# Patient Record
Sex: MALE
[De-identification: names, ages, dates, MRNs, and addresses within clinical notes are randomized per-mention and may not be internally consistent; named-entity substitution may affect disease eponyms.]

---

## 2018-05-29 ENCOUNTER — HOSPITAL ENCOUNTER (EMERGENCY)
Dept: HOSPITAL 14 - H.ER | Age: 23
Discharge: HOME | End: 2018-05-29
Payer: COMMERCIAL

## 2018-05-29 VITALS — OXYGEN SATURATION: 98 % | RESPIRATION RATE: 16 BRPM

## 2018-05-29 VITALS — SYSTOLIC BLOOD PRESSURE: 132 MMHG | DIASTOLIC BLOOD PRESSURE: 71 MMHG | TEMPERATURE: 98.1 F

## 2018-05-29 DIAGNOSIS — R07.89: Primary | ICD-10-CM

## 2018-05-29 LAB
ALBUMIN SERPL-MCNC: 4.4 G/DL (ref 3.5–5)
ALBUMIN/GLOB SERPL: 1.2 {RATIO} (ref 1–2.1)
ALT SERPL-CCNC: 51 U/L (ref 21–72)
APTT BLD: 29.8 SECONDS (ref 25.6–37.1)
AST SERPL-CCNC: 35 U/L (ref 17–59)
BASOPHILS # BLD AUTO: 0.1 K/UL (ref 0–0.2)
BASOPHILS NFR BLD: 0.9 % (ref 0–2)
BUN SERPL-MCNC: 13 MG/DL (ref 9–20)
CALCIUM SERPL-MCNC: 9.2 MG/DL (ref 8.4–10.2)
D DIMER: 96 NG/MLDDU (ref 0–230)
EOSINOPHIL # BLD AUTO: 0.1 K/UL (ref 0–0.7)
EOSINOPHIL NFR BLD: 1.7 % (ref 0–4)
ERYTHROCYTE [DISTWIDTH] IN BLOOD BY AUTOMATED COUNT: 13.8 % (ref 11.5–14.5)
GFR NON-AFRICAN AMERICAN: > 60
HGB BLD-MCNC: 14.3 G/DL (ref 12–18)
INR PPP: 0.9 (ref 0.9–1.2)
LIPASE SERPL-CCNC: 29 U/L (ref 23–300)
LYMPHOCYTES # BLD AUTO: 2.4 K/UL (ref 1–4.3)
LYMPHOCYTES NFR BLD AUTO: 31.8 % (ref 20–40)
MCH RBC QN AUTO: 31.9 PG (ref 27–31)
MCHC RBC AUTO-ENTMCNC: 35.8 G/DL (ref 33–37)
MCV RBC AUTO: 89 FL (ref 80–94)
MONOCYTES # BLD: 0.4 K/UL (ref 0–0.8)
MONOCYTES NFR BLD: 5.1 % (ref 0–10)
NEUTROPHILS # BLD: 4.5 K/UL (ref 1.8–7)
NEUTROPHILS NFR BLD AUTO: 60.5 % (ref 50–75)
NRBC BLD AUTO-RTO: 0 % (ref 0–0)
PLATELET # BLD: 241 K/UL (ref 130–400)
PMV BLD AUTO: 8.3 FL (ref 7.2–11.7)
PROTHROMBIN TIME: 10 SECONDS (ref 9.8–13.1)
RBC # BLD AUTO: 4.48 MIL/UL (ref 4.4–5.9)
WBC # BLD AUTO: 7.5 K/UL (ref 4.8–10.8)

## 2018-05-29 PROCEDURE — 87536 HIV-1 QUANT&REVRSE TRNSCRPJ: CPT

## 2018-05-29 PROCEDURE — 85025 COMPLETE CBC W/AUTO DIFF WBC: CPT

## 2018-05-29 PROCEDURE — 80349 CANNABINOIDS NATURAL: CPT

## 2018-05-29 PROCEDURE — 85730 THROMBOPLASTIN TIME PARTIAL: CPT

## 2018-05-29 PROCEDURE — 80053 COMPREHEN METABOLIC PANEL: CPT

## 2018-05-29 PROCEDURE — 96372 THER/PROPH/DIAG INJ SC/IM: CPT

## 2018-05-29 PROCEDURE — 99283 EMERGENCY DEPT VISIT LOW MDM: CPT

## 2018-05-29 PROCEDURE — 83690 ASSAY OF LIPASE: CPT

## 2018-05-29 PROCEDURE — 80353 DRUG SCREENING COCAINE: CPT

## 2018-05-29 PROCEDURE — 85610 PROTHROMBIN TIME: CPT

## 2018-05-29 PROCEDURE — 80361 OPIATES 1 OR MORE: CPT

## 2018-05-29 PROCEDURE — 71046 X-RAY EXAM CHEST 2 VIEWS: CPT

## 2018-05-29 PROCEDURE — 80324 DRUG SCREEN AMPHETAMINES 1/2: CPT

## 2018-05-29 PROCEDURE — 80346 BENZODIAZEPINES1-12: CPT

## 2018-05-29 PROCEDURE — 80345 DRUG SCREENING BARBITURATES: CPT

## 2018-05-29 PROCEDURE — 85378 FIBRIN DEGRADE SEMIQUANT: CPT

## 2018-05-29 PROCEDURE — 80358 DRUG SCREENING METHADONE: CPT

## 2018-05-29 PROCEDURE — 83992 ASSAY FOR PHENCYCLIDINE: CPT

## 2018-05-29 PROCEDURE — 84484 ASSAY OF TROPONIN QUANT: CPT

## 2018-05-29 NOTE — ED PDOC
HPI: Chest Pain


Time Seen by Provider: 05/29/18 11:31


Chief Complaint (Nursing): Chest Pain


Chief Complaint (Provider): Chest Pain


History Per: Patient


History/Exam Limitations: no limitations


Onset/Duration Of Symptoms: Days (x3)


Current Symptoms Are (Timing): Still Present


Quality: Squeezing


Additional Complaint(s): 


23 year old male, with a past medical history of HIV, presents to ED 

complaining of intermittent chest pain on the left side for the past 3 days. He 

states pain comes at rest and doesn't worsen with activity or exertion. Patient 

also reports right sided ear ache associated with a headache for the past 2 

weeks.  He was seen by his PMD last week and prescribed antibiotic ear drops 

which have relieved symptoms but wants repeat inspection.  Denies fever, SOB, 

abdominal pain, cough, recent travel, prolonged immobility, calf pain, leg pain

, vision changes, nausea, vomiting, diarrhea, and orthopnea.  Of note, patient 

had a viral load done 4- 6 months ago which was undetectable.  Patient has been 

compliant with his HIV regimen.  





PCP: Jorge Clinton





Past Medical History


Reviewed: Historical Data, Nursing Documentation, Vital Signs


Vital Signs: 


 Last Vital Signs











Temp  98.1 F   05/29/18 14:39


 


Pulse  66   06/01/18 22:20


 


Resp  16   05/29/18 14:39


 


BP  132/71   05/29/18 14:39


 


Pulse Ox  98   06/01/18 22:20














- Medical History


PMH: HIV





- Surgical History


Surgical History: No Surg Hx





- Family History


Family History: States: Unknown Family Hx


Other Family History: Patient is adopted and unaware of family history.





- Social History


Current smoker - smoking cessation education provided: Yes (4 cigarettes daily)


Alcohol: Social


Drugs: Denies





- Home Medications


Home Medications: 


 Ambulatory Orders











 Medication  Instructions  Recorded


 


Naproxen 500 mg PO BID #20 tab 05/29/18














- Allergies


Allergies/Adverse Reactions: 


 Allergies











Allergy/AdvReac Type Severity Reaction Status Date / Time


 


No Known Allergies Allergy   Verified 05/29/18 11:14














Review of Systems


ROS Statement: Except As Marked, All Systems Reviewed And Found Negative


Constitutional: Negative for: Fever


Eyes: Negative for: Vision Change


ENT: Positive for: Ear Pain (associated with headache)


Cardiovascular: Positive for: Chest Pain


Respiratory: Negative for: Cough, Shortness of Breath


Gastrointestinal: Negative for: Nausea, Vomiting, Diarrhea


Musculoskeletal: Negative for: Leg Pain, Other (calf pain)





Physical Exam





- Reviewed


Nursing Documentation Reviewed: Yes


Vital Signs Reviewed: Yes





- Physical Exam


Comments: 


GENERAL APPEARANCE: Patient is awake, alert, oriented x 3, in no acute distress

, resting comfortably, speaking in full sentences. 


SKIN:  Warm, dry; (-) cyanosis.


EYES:  (-) conjunctival pallor.


ENMT:  Mucous membranes moist. EARS: TMs are nonbulging and nonerythematous 

bilaterally. Pharynx: clear (-) erythema (-) exudate. Uvula midline. Nares 

patent (-) rhinorrhea


NECK: Supple, FROM (-) tenderness, (-) stiffness, (-) lymphadenopathy, (-) JVD.


CHEST AND RESPIRATORY:  (-) rash, (-) chest wall tenderness.  Lungs:  (-) rales

, (-) rhonchi, (-) wheezes, (-) rub; breath sounds equal bilaterally. 

Respirations even and nonlabored. 


HEART AND CARDIOVASCULAR:  (-) chest wall tenderness (-) irregularity; (-) 

murmur, (-) gallop, (-) rub.


ABDOMEN AND GI:  Soft; (-) distention, (-) tenderness, (-) palpable pulsatile 

mass, (-) CVA tenderness.


EXTREMITIES:  (-) deformity; (-) edema, (-) calf tenderness.   (+) distal 

pulses.


NEURO AND PSYCH:  Mental status as above.  Cranial nerves grossly intact; 

strength symmetric. Cerebellar tests intact. Gait steady, speech clear.





- Laboratory Results


Result Diagrams: 


 05/29/18 12:15





 05/29/18 12:15


Urine dip results: Positive for: Protein (30).  Negative for: Leukocyte Esterase

, Blood, Nitrate, Ketones, Glucose, Bilirubin





- ECG


ECG: Positive for: Interpreted By Me, Viewed By Me


ECG Rhythm: Positive for: Sinus Rhythm (normal), ST/T Changes (no elevations)


Interpretation Of ECG: 





No ectopy and QTC at 427.  


Rate: 66


O2 Sat by Pulse Oximetry: 98 (RA)


Pulse Ox Interpretation: Normal





Medical Decision Making


Medical Decision Making: 


Initial Impression: Chest pain


Initial Plan: 


IV access


CMP


CBC


D-Dimer


PTT


PT/INR


Drug screen


Lipase


Troponin


Urine dipstick


Chest X-ray


HIV 1 RNA


Toradol 30mg IM


EKG





1345


Labs reviewed and grossly unremarkable. Utox (+) cannabinoids.





CXR reviewed, radiology report follows:


HISTORY:


COMPARISON:


No prior.


TECHNIQUE:


Chest PA and lateral


FINDINGS:


LINES AND TUBES:


None. 


LUNG AND PLEURA:


The lungs are well inflated and clear. No pleural effusion or pneumothorax. 


HEART AND MEDIASTINUM:


The heart is not enlarged. The hilar and mediastinal contours are within normal 

limits.


SKELETAL STRUCTURES:


The bony structures are within normal limits for the patient's age.


VISUALIZED UPPER ABDOMEN:


Normal.


OTHER FINDINGS:


None.


IMPRESSION:


No active pulmonary disease.








1425


On re-evaluation, patient reports resolution of symptoms and denies chest pain, 

SOB, dizziness, headache at present. On exam, patient remains AAOx3, in no 

acute distress. Lungs clear to auscultation, cardiac RRR, abdomen soft, non-

tender, repeat neuro exam shows no focal findings.  VSS, stable for discharge. 


Lab/Diagnostic results d/w the patient in great detail. Diagnosis of 

nonspecific chest pain d/w the patient. 


Based on history, exam and diagnostic results, plan will be for outpatient 

follow up. 





Patient instructed to follow-up with pmd / referral provided / the clinic  in 1-

2 days without fail. Advised to take medication as prescribed. 


Return to the emergency room at any time for any new or worsening symptoms. 

Patient states he fully agrees with and understands discharge instructions. 

States that he agrees with the plan and disposition. Verbalized and repeated 

discharge instructions and plan. 


I have given the patient opportunity to ask any additional questions.





--------------------------------------------------------------------------------

--------------------------------------------------------------------------------

-------


Scribe Attestation:


Documented by Nate Mendenhall acting as a scribe for Lela SOLIMAN.





Provider Scribe Attestation:


All medical record entries made by the Scribe were at my direction and 

personally dictated by me. I have reviewed the chart and agree that the record 

accurately reflects my personal performance of the history, physical exam, 

medical decision making, and the department course for this patient. I have 

also personally directed, reviewed, and agree with the discharge instructions 

and disposition.





Disposition





- Clinical Impression


Clinical Impression: 


 Nonspecific chest pain








- Patient ED Disposition


Is Patient to be Admitted: No


Counseled Patient/Family Regarding: Studies Performed, Diagnosis, Need For 

Followup, Rx Given





- Disposition


Referrals: 


Jorge Blair MD [Staff Provider] - 


Disposition: Routine/Home


Disposition Time: 14:32


Condition: STABLE


Additional Instructions: 


FOLLOW UP WITH PMD IN 1-2 DAYS WITHOUT FAIL.


RETURN TO ED WITH ANY NEW OR WORSENING SYMPTOMS. 


Prescriptions: 


Naproxen 500 mg PO BID #20 tab


Instructions:  Chest Pain, Chest Pain That Is Not Caused by the Heart (DC)


Forms:  [employer] (English)


Print Language: ENGLISH





- POA


Present On Arrival: None





Results





- Lab Results


Lab Results: 














  05/29/18 05/29/18 05/29/18





  18:37 12:47 12:15


 


WBC   


 


RBC   


 


Hgb   


 


Hct   


 


MCV   


 


MCH   


 


MCHC   


 


RDW   


 


Plt Count   


 


MPV   


 


Neut % (Auto)   


 


Lymph % (Auto)   


 


Mono % (Auto)   


 


Eos % (Auto)   


 


Baso % (Auto)   


 


Neut # (Auto)   


 


Lymph # (Auto)   


 


Mono # (Auto)   


 


Eos # (Auto)   


 


Baso # (Auto)   


 


PT    10.0


 


INR    0.9


 


APTT    29.8


 


D-Dimer, Quantitative    96


 


Sodium   


 


Potassium   


 


Chloride   


 


Carbon Dioxide   


 


Anion Gap   


 


BUN   


 


Creatinine   


 


Est GFR ( Amer)   


 


Est GFR (Non-Af Amer)   


 


Random Glucose   


 


Calcium   


 


Total Bilirubin   


 


AST   


 


ALT   


 


Alkaline Phosphatase   


 


Troponin I   


 


Total Protein   


 


Albumin   


 


Globulin   


 


Albumin/Globulin Ratio   


 


Lipase   


 


Urine Opiates Screen   Negative 


 


Urine Methadone Screen   Negative 


 


Ur Barbiturates Screen   Negative 


 


Ur Phencyclidine Scrn   Negative 


 


Ur Amphetamines Screen   Negative 


 


U Benzodiazepines Scrn   Negative 


 


U Oth Cocaine Metabols   Negative 


 


U Cannabinoids Screen   Positive H 


 


HIV-1 RNA Qnt (RT-PCR)  <1.30 not detected  














  05/29/18 05/29/18





  12:15 12:15


 


WBC   7.5


 


RBC   4.48


 


Hgb   14.3


 


Hct   39.9


 


MCV   89.0


 


MCH   31.9 H


 


MCHC   35.8


 


RDW   13.8


 


Plt Count   241


 


MPV   8.3


 


Neut % (Auto)   60.5


 


Lymph % (Auto)   31.8


 


Mono % (Auto)   5.1


 


Eos % (Auto)   1.7


 


Baso % (Auto)   0.9


 


Neut # (Auto)   4.5


 


Lymph # (Auto)   2.4


 


Mono # (Auto)   0.4


 


Eos # (Auto)   0.1


 


Baso # (Auto)   0.1


 


PT  


 


INR  


 


APTT  


 


D-Dimer, Quantitative  


 


Sodium  141 


 


Potassium  4.2 


 


Chloride  103 


 


Carbon Dioxide  24 


 


Anion Gap  18 


 


BUN  13 


 


Creatinine  0.9 


 


Est GFR ( Amer)  > 60 


 


Est GFR (Non-Af Amer)  > 60 


 


Random Glucose  93 


 


Calcium  9.2 


 


Total Bilirubin  0.6 


 


AST  35 


 


ALT  51 


 


Alkaline Phosphatase  72 


 


Troponin I  < 0.0120 


 


Total Protein  8.1 


 


Albumin  4.4 


 


Globulin  3.7 


 


Albumin/Globulin Ratio  1.2 


 


Lipase  29 


 


Urine Opiates Screen  


 


Urine Methadone Screen  


 


Ur Barbiturates Screen  


 


Ur Phencyclidine Scrn  


 


Ur Amphetamines Screen  


 


U Benzodiazepines Scrn  


 


U Oth Cocaine Metabols  


 


U Cannabinoids Screen  


 


HIV-1 RNA Qnt (RT-PCR)

## 2018-06-01 VITALS — HEART RATE: 66 BPM

## 2019-02-25 ENCOUNTER — HOSPITAL ENCOUNTER (EMERGENCY)
Dept: HOSPITAL 14 - H.ER | Age: 24
Discharge: HOME | End: 2019-02-25
Payer: COMMERCIAL

## 2019-02-25 VITALS — HEART RATE: 78 BPM | SYSTOLIC BLOOD PRESSURE: 145 MMHG | DIASTOLIC BLOOD PRESSURE: 82 MMHG

## 2019-02-25 VITALS — OXYGEN SATURATION: 99 %

## 2019-02-25 VITALS — TEMPERATURE: 98.4 F | RESPIRATION RATE: 18 BRPM

## 2019-02-25 DIAGNOSIS — B20: ICD-10-CM

## 2019-02-25 DIAGNOSIS — F17.210: ICD-10-CM

## 2019-02-25 DIAGNOSIS — J45.901: Primary | ICD-10-CM

## 2019-02-25 RX ADMIN — IPRATROPIUM BROMIDE AND ALBUTEROL SULFATE STA ML: .5; 3 SOLUTION RESPIRATORY (INHALATION) at 21:36

## 2019-02-25 RX ADMIN — IPRATROPIUM BROMIDE AND ALBUTEROL SULFATE STA ML: .5; 3 SOLUTION RESPIRATORY (INHALATION) at 21:37

## 2019-02-25 NOTE — ED PDOC
HPI: SOB/CHF/COPD


Time Seen by Provider: 19 20:52


Chief Complaint (Nursing): Shortness Of Breath


Chief Complaint (Provider): Shortness Of Breath


History Per: Patient


History/Exam Limitations: no limitations


Onset/Duration Of Symptoms: Hrs


Current Symptoms Are (Timing): Still Present


Additional Complaint(s): 


23 y/o male with a PMHx of Asthma and is an active smoker presents to the ED for

evaluation of dyspnea and shortness of breath. Patient reports of using a 

Ventolin pump that  in 2017 with some relief of symptoms. Otherwise, 

patient denies fevers, sick contacts and recent travel. 





PMD: Fermín Blair





Past Medical History


Reviewed: Historical Data, Nursing Documentation, Vital Signs


Vital Signs: 





                                Last Vital Signs











Temp  98.4 F   19 20:28


 


Pulse  78   19 22:29


 


Resp  18   19 22:29


 


BP  145/82   19 22:29


 


Pulse Ox  99   19 22:29














- Medical History


PMH: Asthma (seasonal), HIV





- Surgical History


Surgical History: No Surg Hx





- Family History


Family History: States: Unknown Family Hx





- Social History


Current smoker - smoking cessation education provided: Yes (0.5 packs a day)





- Home Medications


Home Medications: 


                                Ambulatory Orders











 Medication  Instructions  Recorded


 


RX: Naproxen 500 mg PO BID #20 tab 18


 


Albuterol HFA [Ventolin HFA 90 2 puff IH [id number] #1 puff 19





mcg/actuation (8 g)]  


 


RX: predniSONE [predniSONE Tab] 60 mg PO DAILY #9 tab 19














- Allergies


Allergies/Adverse Reactions: 


                                    Allergies











Allergy/AdvReac Type Severity Reaction Status Date / Time


 


No Known Allergies Allergy   Verified 18 11:14














Review of Systems


ROS Statement: Except As Marked, All Systems Reviewed And Found Negative


Respiratory: Positive for: Shortness of Breath, Other (dyspnea)





Physical Exam





- Reviewed


Nursing Documentation Reviewed: Yes


Vital Signs Reviewed: Yes





- Physical Exam


Appears: Positive for: No Acute Distress


Head Exam: Positive for: ATRAUMATIC, NORMOCEPHALIC


Skin: Positive for: Normal Color, Warm, Dry


Eye Exam: Positive for: Normal appearance, EOMI, PERRL


Neck: Positive for: Normal, Painless ROM, Supple


Cardiovascular/Chest: Positive for: Regular Rate, Rhythm.  Negative for: Murmur


Respiratory: Positive for: Wheezing (mild faint wheezing bilaterally).  Negative

for: Accessory Muscle Use, Respiratory Distress


Gastrointestinal/Abdominal: Positive for: Normal Exam, Soft.  Negative for: 

Tenderness


Extremity: Positive for: Normal ROM.  Negative for: Deformity


Neurologic/Psych: Positive for: Alert, Oriented.  Negative for: Motor/Sensory 

Deficits





- ECG


O2 Sat by Pulse Oximetry: 99 (RA)


Pulse Ox Interpretation: Normal





Medical Decision Making


Medical Decision Making: 


Time: 


A/P: 23 y/o active smoker with a history of Asthma presenting with shortness of 

breath. 


-- Patient very well appearing with normal vitals.


-- Likely experiencing minor asthma exacerbation. 


-- Will provide nebulizer treatment and steroids. 


-- Will obtain CXR





-- EKG


-- CXR Two Views


-- Duoneb 3mg/0.5mg 3ml (UD) 3ml INH


-- Duoneb 3mg/0.5mg 3ml (UD) 3ml INH


-- PredniSONE 40 mg PO


-- Peak Flow Pre/Post Tx





Time: 2200


-- Patient no longer wheezing and reports an improvement of symptoms at this 

time. Provided patient risks of smoking and damages to lungs. Patient strongly 

advised to follow up with PMD at [place] for further management. 


 

________________________________________________________________________________


___________


Scribe Attestation:


Documented by Carla Stephen, acting as a scribe for Sean Wyman MD.





Provider Scribe Attestation:


All medical record entries made by the Scribe were at my direction and 

personally dictated by me. I have reviewed the chart and agree that the record 

accurately reflects my personal performance of the history, physical exam, 

medical decision making, and the department course for this patient. I have also

personally directed, reviewed, and agree with the discharge instructions and 

disposition.





Disposition





- Clinical Impression


Clinical Impression: 


 Asthma exacerbation





Counseled Patient/Family Regarding: Studies Performed, Diagnosis, Need For 

Followup, Smoking Cessation





- Disposition


Referrals: 


Fermín Blair MD [Staff Provider] - 


Disposition: Routine/Home


Disposition Time: 23:30


Condition: STABLE


Prescriptions: 


Albuterol HFA [Ventolin HFA 90 mcg/actuation (8 g)] 2 puff IH [id number] #1 puff


RX: predniSONE [predniSONE Tab] 60 mg PO DAILY #9 tab


Instructions:  Smoking: Not Just Harmful to Your Lungs and Heart, Asthma, Adult 

(DC), Quitting Smoking, Drugs to Help You Stop Using Tobacco


Forms:  [employer] (English)

## 2019-02-26 NOTE — CARD
--------------- APPROVED REPORT --------------





Date of service: 02/25/2019



EKG Measurement

Heart [id number]

MT [id number]

[id number]

[id number]

[id number]



<Conclusion>

Normal sinus rhythm

Normal ECG

## 2019-02-26 NOTE — RAD
Date of service: 



02/25/2019



HISTORY:

 smoker, cough 



COMPARISON:

Chest radiographs 05/29/2018.



TECHNIQUE:

Chest PA and lateral



FINDINGS:



LUNGS:

Slightly diminished inspiratory volume.  No infiltrate bilaterally.



PLEURA:

No significant pleural effusion identified. No pneumothorax apparent.



CARDIOVASCULAR:

No aortic atherosclerotic calcification present.



Normal cardiac size. No pulmonary vascular congestion. 



OSSEOUS STRUCTURES:

No significant abnormalities.



VISUALIZED UPPER ABDOMEN:

Normal.



OTHER FINDINGS:

None.



IMPRESSION:

Diminished inspiratory volume.  No acute infiltrate bilaterally in 

the interval.  No acute cardiovascular changes identified.